# Patient Record
Sex: MALE | Race: WHITE | NOT HISPANIC OR LATINO
[De-identification: names, ages, dates, MRNs, and addresses within clinical notes are randomized per-mention and may not be internally consistent; named-entity substitution may affect disease eponyms.]

---

## 2022-11-29 ENCOUNTER — APPOINTMENT (OUTPATIENT)
Dept: ORTHOPEDIC SURGERY | Facility: CLINIC | Age: 82
End: 2022-11-29

## 2022-11-29 DIAGNOSIS — M21.40 FLAT FOOT [PES PLANUS] (ACQUIRED), UNSPECIFIED FOOT: ICD-10-CM

## 2022-11-29 DIAGNOSIS — K50.90 CROHN'S DISEASE, UNSPECIFIED, W/OUT COMPLICATIONS: ICD-10-CM

## 2022-11-29 DIAGNOSIS — M20.41 OTHER HAMMER TOE(S) (ACQUIRED), RIGHT FOOT: ICD-10-CM

## 2022-11-29 DIAGNOSIS — Z00.00 ENCOUNTER FOR GENERAL ADULT MEDICAL EXAMINATION W/OUT ABNORMAL FINDINGS: ICD-10-CM

## 2022-11-29 PROCEDURE — 73630 X-RAY EXAM OF FOOT: CPT | Mod: RT

## 2022-11-29 NOTE — IMAGING
[Right] : right foot [Weight -] : weightbearing [There are no fractures, subluxations or dislocations. No significant abnormalities are seen] : There are no fractures, subluxations or dislocations. No significant abnormalities are seen [de-identified] : 2nd-5th hammertoes; evidence of prior 5th metatarsal neck fracture that is healed.

## 2022-11-29 NOTE — PHYSICAL EXAM
[Right] : right foot and ankle [2nd] : 2nd [3rd] : 3rd [4th] : 4th [5th] : 5th [NL (20)] : dorsiflexion 20 degrees [NL (40)] : plantar flexion 40 degrees [2+] : dorsalis pedis pulse: 2+ [] : no achilles tendon insertion tenderness [FreeTextEntry3] : fixed hammer toes; IP joint dorsal callous formation tasha 4th toe

## 2022-11-29 NOTE — HISTORY OF PRESENT ILLNESS
[6] : 6 [5] : 5 [Dull/Aching] : dull/aching [Localized] : localized [Intermittent] : intermittent [Household chores] : household chores [Leisure] : leisure [Social interactions] : social interactions [Rest] : rest [Sitting] : sitting [Standing] : standing [Walking] : walking [Stairs] : stairs [de-identified] : 11/29/22: Pt is a 82 year old M who presents today for evaluation of their right foot. Pt states that he has hammer toes that have become painful and causing difficulty walking. Denies trauma/previous injury. No numbness/tingling. No formal treatment to date. WB in boots. Was using old orthotics insoles. Hx Crohn's [] : Post Surgical Visit: no [FreeTextEntry1] : R foot